# Patient Record
Sex: MALE | Race: WHITE | NOT HISPANIC OR LATINO
[De-identification: names, ages, dates, MRNs, and addresses within clinical notes are randomized per-mention and may not be internally consistent; named-entity substitution may affect disease eponyms.]

---

## 2022-03-24 PROBLEM — Z00.00 ENCOUNTER FOR PREVENTIVE HEALTH EXAMINATION: Status: ACTIVE | Noted: 2022-03-24

## 2022-05-06 ENCOUNTER — APPOINTMENT (OUTPATIENT)
Dept: ORTHOPEDIC SURGERY | Facility: CLINIC | Age: 62
End: 2022-05-06
Payer: OTHER MISCELLANEOUS

## 2022-05-06 VITALS — BODY MASS INDEX: 31.83 KG/M2 | HEIGHT: 72 IN | WEIGHT: 235 LBS

## 2022-05-06 DIAGNOSIS — I10 ESSENTIAL (PRIMARY) HYPERTENSION: ICD-10-CM

## 2022-05-06 PROCEDURE — 99072 ADDL SUPL MATRL&STAF TM PHE: CPT

## 2022-05-06 PROCEDURE — 99214 OFFICE O/P EST MOD 30 MIN: CPT

## 2022-05-06 PROCEDURE — 73564 X-RAY EXAM KNEE 4 OR MORE: CPT | Mod: RT

## 2022-05-06 NOTE — WORK
[Total] : total [Reveals pre-existing condition(s) that may affect treatment/prognosis] : reveals pre-existing condition(s) that may affect treatment/prognosis [Cannot return to work because ________] : cannot return to work because [unfilled] [Patient] : patient [No Rx restrictions] : No Rx restrictions. [I provided the services listed above] :  I provided the services listed above. [FreeTextEntry1] : james

## 2022-06-12 ENCOUNTER — FORM ENCOUNTER (OUTPATIENT)
Age: 62
End: 2022-06-12

## 2022-06-13 ENCOUNTER — APPOINTMENT (OUTPATIENT)
Dept: MRI IMAGING | Facility: CLINIC | Age: 62
End: 2022-06-13
Payer: OTHER MISCELLANEOUS

## 2022-06-13 PROCEDURE — 73721 MRI JNT OF LWR EXTRE W/O DYE: CPT | Mod: RT

## 2022-06-13 PROCEDURE — 99072 ADDL SUPL MATRL&STAF TM PHE: CPT

## 2022-07-08 ENCOUNTER — APPOINTMENT (OUTPATIENT)
Dept: ORTHOPEDIC SURGERY | Facility: CLINIC | Age: 62
End: 2022-07-08

## 2022-07-08 VITALS — HEIGHT: 72 IN | WEIGHT: 235 LBS | BODY MASS INDEX: 31.83 KG/M2

## 2022-07-08 PROCEDURE — 99072 ADDL SUPL MATRL&STAF TM PHE: CPT

## 2022-07-08 PROCEDURE — 99214 OFFICE O/P EST MOD 30 MIN: CPT

## 2022-07-08 NOTE — WORK
[Total] : total [Reveals pre-existing condition(s) that may affect treatment/prognosis] : reveals pre-existing condition(s) that may affect treatment/prognosis [Cannot return to work because ________] : cannot return to work because [unfilled] [Patient] : patient [No Rx restrictions] : No Rx restrictions. [I provided the services listed above] :  I provided the services listed above. [FreeTextEntry1] : fair

## 2022-07-08 NOTE — DISCUSSION/SUMMARY
[de-identified] : Patient allowed to gently start resuming activities. \par Discussed change to medication prescription and usage. \par Bracing options discussed with patient. \par Activity modification as needed\par Discussed poss future surgery, pt deciding\par request comp auth R knee arthroscopy med meniscectomy, surg discussion questions answered, no guarantees as has degen changes\par request comp auth for orthovisc R knee

## 2022-07-08 NOTE — REVIEW OF SYSTEMS
[Joint Pain] : joint pain [Joint Swelling] : joint swelling [Negative] : Heme/Lymph [FreeTextEntry9] : rt knee

## 2022-07-08 NOTE — DATA REVIEWED
[MRI] : MRI [Right] : of the right [Knee] : knee [Report was reviewed and noted in the chart] : The report was reviewed and noted in the chart [I reviewed the films/CD and agree] : I reviewed the films/CD and agree [FreeTextEntry1] : tear MM, effusion and degen changes

## 2022-07-08 NOTE — PHYSICAL EXAM
[Right] : right knee [Positive] : positive Ludy [] : mildly antalgic [TWNoteComboBox7] : flexion 135 degrees

## 2022-07-08 NOTE — HISTORY OF PRESENT ILLNESS
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

## 2022-07-31 ENCOUNTER — FORM ENCOUNTER (OUTPATIENT)
Age: 62
End: 2022-07-31

## 2023-01-20 ENCOUNTER — APPOINTMENT (OUTPATIENT)
Dept: ORTHOPEDIC SURGERY | Facility: CLINIC | Age: 63
End: 2023-01-20
Payer: OTHER MISCELLANEOUS

## 2023-01-20 VITALS — BODY MASS INDEX: 31.83 KG/M2 | WEIGHT: 235 LBS | HEIGHT: 72 IN

## 2023-01-20 DIAGNOSIS — Z78.9 OTHER SPECIFIED HEALTH STATUS: ICD-10-CM

## 2023-01-20 PROCEDURE — 99072 ADDL SUPL MATRL&STAF TM PHE: CPT

## 2023-01-20 PROCEDURE — 99214 OFFICE O/P EST MOD 30 MIN: CPT

## 2023-01-20 NOTE — HISTORY OF PRESENT ILLNESS
[Work related] : work related [Sudden] : sudden [Localized] : localized [Household chores] : household chores [Rest] : rest [Not working due to injury] : Work status: not working due to injury [de-identified] : this happened 5/2/22 and was climbing out of oil truck and developed pain, no injury, has not worked since, tried tylenol, no swelling, has torn med meniscus and had episode of swelling couple of weeks ago which has decreased some what [9] : 9 [Radiating] : radiating [Stabbing] : stabbing [Nothing helps with pain getting better] : Nothing helps with pain getting better [Sitting] : sitting [Walking] : walking [] : yes [FreeTextEntry1] : rt knee [FreeTextEntry3] : 8-13-21 [FreeTextEntry7] : below knee [FreeTextEntry9] : sleeve, tylenol [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

## 2023-01-20 NOTE — DISCUSSION/SUMMARY
[de-identified] : Patient allowed to gently start resuming activities. \par Discussed change to medication prescription and usage. \par Activity modification as needed\par Discussed poss future surgery, he desires\par request comp auth R knee arthroscopy med meniscectomy, surg discussion questions answered, no guarantees as has degen changes\par WC fighting that it is related to the prir case

## 2023-03-03 ENCOUNTER — APPOINTMENT (OUTPATIENT)
Dept: ORTHOPEDIC SURGERY | Facility: CLINIC | Age: 63
End: 2023-03-03

## 2023-03-03 ENCOUNTER — APPOINTMENT (OUTPATIENT)
Dept: ORTHOPEDIC SURGERY | Facility: CLINIC | Age: 63
End: 2023-03-03
Payer: OTHER MISCELLANEOUS

## 2023-03-03 VITALS — WEIGHT: 235 LBS | HEIGHT: 72 IN | BODY MASS INDEX: 31.83 KG/M2

## 2023-03-03 PROCEDURE — 99072 ADDL SUPL MATRL&STAF TM PHE: CPT

## 2023-03-03 PROCEDURE — 99214 OFFICE O/P EST MOD 30 MIN: CPT

## 2023-03-03 NOTE — HISTORY OF PRESENT ILLNESS
[Work related] : work related [Sudden] : sudden [6] : 6 [Burning] : burning [Radiating] : radiating [Shooting] : shooting [Household chores] : household chores [Leisure] : leisure [Rest] : rest [Meds] : meds [Ice] : ice [Standing] : standing [Walking] : walking [Not working due to injury] : Work status: not working due to injury [de-identified] : Right knee symptoms persist. Injury 5/2/22 and was climbing out of oil truck and developed pain, no injury, has not worked since, tried tylenol, no swelling, has torn med meniscus  [] : no [FreeTextEntry1] : rt knee [FreeTextEntry3] : 5-2-22 [FreeTextEntry9] : brace [de-identified] : over use

## 2023-03-03 NOTE — DISCUSSION/SUMMARY
[de-identified] : Patient allowed to gently start resuming activities. \par Discussed change to medication prescription and usage. \par Activity modification as needed\par request comp auth R knee arthroscopy med meniscectomy, surg discussion questions answered, no guarantees as has degen changes\par WC fighting that it is related to the prior case

## 2023-03-03 NOTE — WORK
[Total] : total [Reveals pre-existing condition(s) that may affect treatment/prognosis] : reveals pre-existing condition(s) that may affect treatment/prognosis [Cannot return to work because ________] : cannot return to work because [unfilled] [Patient] : patient [No Rx restrictions] : No Rx restrictions. [I provided the services listed above] :  I provided the services listed above. [FreeTextEntry1] : fair needs surgery

## 2023-04-21 ENCOUNTER — APPOINTMENT (OUTPATIENT)
Dept: ORTHOPEDIC SURGERY | Facility: CLINIC | Age: 63
End: 2023-04-21
Payer: OTHER MISCELLANEOUS

## 2023-04-21 DIAGNOSIS — S83.91XD SPRAIN OF UNSPECIFIED SITE OF RIGHT KNEE, SUBSEQUENT ENCOUNTER: ICD-10-CM

## 2023-04-21 PROCEDURE — 99214 OFFICE O/P EST MOD 30 MIN: CPT

## 2023-04-21 RX ORDER — CELECOXIB 200 MG/1
200 CAPSULE ORAL
Qty: 30 | Refills: 0 | Status: ACTIVE | COMMUNITY
Start: 2023-04-21 | End: 1900-01-01

## 2023-04-21 NOTE — DISCUSSION/SUMMARY
[de-identified] : Patient allowed to gently start resuming activities. \par Discussed change to medication prescription and usage. 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 Patient had an injury to bilateral knees 8/13/21 and was treated in the office, returned to work and was feeling better, tore the meniscus right knee, on the 5/2/22. Please authorize surgery.\par \par Poss of CSI discussed.

## 2023-04-21 NOTE — HISTORY OF PRESENT ILLNESS
[Work related] : work related [Sudden] : sudden [6] : 6 [Burning] : burning [Radiating] : radiating [Shooting] : shooting [Household chores] : household chores [Leisure] : leisure [Rest] : rest [Meds] : meds [Ice] : ice [Standing] : standing [Walking] : walking [Not working due to injury] : Work status: not working due to injury [de-identified] : Right knee symptoms persist. 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 Patient has not yet been authorized for surgical intervention, scheduled for hearing.  [] : no [FreeTextEntry1] : rt knee [FreeTextEntry3] : 5-2-22 [de-identified] : over use [FreeTextEntry9] : brace

## 2023-04-21 NOTE — PHYSICAL EXAM
[Right] : right knee [Positive] : positive Ludy [] : ligamentously stable [TWNoteComboBox7] : flexion 135 degrees

## 2023-04-21 NOTE — WORK
[Reveals pre-existing condition(s) that may affect treatment/prognosis] : reveals pre-existing condition(s) that may affect treatment/prognosis [Cannot return to work because ________] : cannot return to work because [unfilled] [Patient] : patient [No Rx restrictions] : No Rx restrictions. [I provided the services listed above] :  I provided the services listed above. [FreeTextEntry1] : fair needs surgery

## 2023-05-19 ENCOUNTER — APPOINTMENT (OUTPATIENT)
Dept: ORTHOPEDIC SURGERY | Facility: CLINIC | Age: 63
End: 2023-05-19

## 2024-01-10 ENCOUNTER — APPOINTMENT (OUTPATIENT)
Dept: ORTHOPEDIC SURGERY | Facility: CLINIC | Age: 64
End: 2024-01-10
Payer: OTHER MISCELLANEOUS

## 2024-01-10 VITALS — BODY MASS INDEX: 31.83 KG/M2 | WEIGHT: 235 LBS | HEIGHT: 72 IN

## 2024-01-10 DIAGNOSIS — M17.11 UNILATERAL PRIMARY OSTEOARTHRITIS, RIGHT KNEE: ICD-10-CM

## 2024-01-10 DIAGNOSIS — Z86.718 PERSONAL HISTORY OF OTHER VENOUS THROMBOSIS AND EMBOLISM: ICD-10-CM

## 2024-01-10 PROCEDURE — 99203 OFFICE O/P NEW LOW 30 MIN: CPT

## 2024-01-10 NOTE — DISCUSSION/SUMMARY
[de-identified] : Extensive discussion of the options was had with the patient. This discussion included both surgical and nonsurgical options. Options including but not limited to cortisone injection, viscosupplementation, physical therapy, oral anti-inflammatories, MRI, arthroplasty and arthroscopy were discussed with the patient. We also discussed the option of observation, allowing the patient to continue rest, ice, NSAIDs and following up if the condition does not improve. Time was taken to go over any questions the patient had in regards to any of the treatment plans described. The Risks, benefits, alternatives and expectations of the proposed procedure, as well as non-operative management, were discussed at length with the patient, as well as the procedure itself and the expected recovery period. The possible need for post operative Physical Therapy was also discussed. The patient was allowed as much time as necessary to ask all appropriate questions and they were answered to the patient's satisfaction.  Risks involving the knee arthroscopy were discussed with the patient and include infection, bleeding, anesthesias complications, NV injury.  He states he has tried PT without relief and has tried NSAIDS without relief.   The following information has been documented by Collette Khan acting as a scribe. Dr. Ramirez Attestation The documentation recorded by the scribe, in my presence, accurately reflects the service I, Dr. Ramirez, personally performed, and the decisions made by me with my edits as appropriate.

## 2024-01-10 NOTE — HISTORY OF PRESENT ILLNESS
[de-identified] : Patient is here today for the right knee, W/C injury that occurred on 8/13/2021. Patient had MRI Right knee. He states he had CSI in the past as well as PT.  He never had knee surgery.  He notes pain anterior and anteromedial aspect of the knee. He notes clicking and popping of the knee. He states the knee georgina as well on occasion. He states the knee "blows" up at times.

## 2024-01-10 NOTE — PHYSICAL EXAM
[Right] : right knee [AP] : anteroposterior [Lateral] : lateral [Outside films reviewed] : Outside films reviewed [de-identified] : Constitutional: The patient appears well developed, well nourished. Examination of patients ability to communicate functionally was normal.       Neurologic: Coordination is normal. Alert and oriented to time, place and person. No evidence of mood disorder, calm affect.         RIGHT    KNEE: Inspection of the knee is as follows: mild effusion. no ecchymosis, no streaking, no erythema, no atrophy, no deformities of the quad tendon and no deformities of patellar tendon.       Palpation of the knee is as follows: LATERAL JOINT LINE AND  medial joint line tenderness. no obvious defects, no palpable masses, no increased warmth and no crepitus.       Knee Range of Motion is as follows in degrees:       Extension: 0    Flexion: 105      Strength examination of the knee is as follows:       Quadriceps strength is 5/5   Hamstring strength is 5/5       Ligament Stability and Special Test:  positive McMurrays test. ligamentously stable, negative anterior draw, negative Lachman test, negative posterior draw and no varus or valgus instability. patella tracks well and able to do active straight leg raise without an extensor lag.       Neurological examination of the knee is as follows: light touch is intact throughout.       Gait and function is as follows: MILD antalgic gait.  [FreeTextEntry9] : ZP show med/lateral femoral condyle degenerative changes

## 2024-02-15 RX ORDER — HYDROCODONE BITARTRATE AND ACETAMINOPHEN 5; 325 MG/1; MG/1
5-325 TABLET ORAL
Qty: 25 | Refills: 0 | Status: ACTIVE | COMMUNITY
Start: 2024-02-15 | End: 1900-01-01

## 2024-02-16 ENCOUNTER — APPOINTMENT (OUTPATIENT)
Dept: ORTHOPEDIC SURGERY | Facility: AMBULATORY SURGERY CENTER | Age: 64
End: 2024-02-16
Payer: OTHER MISCELLANEOUS

## 2024-02-16 PROCEDURE — 29881 ARTHRS KNE SRG MNISECTMY M/L: CPT | Mod: AS,RT

## 2024-02-16 PROCEDURE — 29881 ARTHRS KNE SRG MNISECTMY M/L: CPT

## 2024-02-16 PROCEDURE — 20610 DRAIN/INJ JOINT/BURSA W/O US: CPT | Mod: RT,59

## 2024-02-23 ENCOUNTER — APPOINTMENT (OUTPATIENT)
Dept: ORTHOPEDIC SURGERY | Facility: CLINIC | Age: 64
End: 2024-02-23
Payer: OTHER MISCELLANEOUS

## 2024-02-23 DIAGNOSIS — Z47.89 ENCOUNTER FOR OTHER ORTHOPEDIC AFTERCARE: ICD-10-CM

## 2024-02-23 PROCEDURE — 99024 POSTOP FOLLOW-UP VISIT: CPT

## 2024-02-23 NOTE — HISTORY OF PRESENT ILLNESS
[de-identified] : Date of surgery was 02/16/2024 Surgery performed was right knee arthroscopy, partial medial meniscectomy, medial femoral condyle chondroplasty by Dr Ramirez Current pain score is 5/10 Current medications taken are aspirin, Tylenol Physical therapy is not started yet Patient called earlier this week reporting knee swelling. He has history of DVT. Denies focal calf pain or SOB.  He states swelling is improved but he was given an appointment with me for clinical check.

## 2024-02-23 NOTE — IMAGING
[de-identified] : Laterality is right    Patient is in no acute distress, alert and oriented Sensation is grossly intact to light touch in the foot Motor function is 5/5 in the foot Capillary refill is less than 2 seconds in the toes Lymphadenopathy is not present Peripheral edema is not present   Skin is intact Incisions are healing well Effusion is trace Quadriceps atrophy is present Calf tenderness is not present   Knee extension is 0 Knee flexion is 90   Quadriceps strength is 5-/5 Hamstring strength is 5/5

## 2024-02-23 NOTE — DISCUSSION/SUMMARY
[de-identified] : Patient has no clinical signs of infection or DVT. Discussed option for duplex, he defers at this time.  Patient to continue WBAT. Script provided for PT.  Tylenol as needed for pain.   Patient to follow up as scheduled with Dr. Ramirez's team next week for suture removal.

## 2024-02-28 ENCOUNTER — APPOINTMENT (OUTPATIENT)
Dept: ORTHOPEDIC SURGERY | Facility: CLINIC | Age: 64
End: 2024-02-28
Payer: OTHER MISCELLANEOUS

## 2024-02-28 PROCEDURE — 99024 POSTOP FOLLOW-UP VISIT: CPT

## 2024-02-28 NOTE — PHYSICAL EXAM
[de-identified] : Constitutional: The patient appears well developed, well nourished.   Neurologic: Coordination is normal. Alert and oriented to time, place and person. No evidence of mood disorder, calm affect.    RIGHT    KNEE: Inspection of the knee is as follows: moderate effusion and small ecchymosis. no streaking, no erythema, no atrophy, no deformities of the quad tendon and no deformities of patellar tendon.   Inspection of the wound reveals clean and dry incision, no fluctuance, no sign of infection, no erythema and no drainage. Mesh/Sutures were removed.  Palpation of the knee is as follows: no increased warmth.  Knee Range of Motion is as follows in degrees:    Extension: 0 Flexion: 90  CALF SOFT NT NVID Strength examination of the knee is as follows:  Quadriceps strength is 5/5  Hamstring strength is 5/5   Ligament Stability and Special Test ligamentously stable and no varus or valgus instability. patella tracks well and able to do active straight leg raise without an extensor lag.   Neurological examination of the knee is as follows: light touch is intact throughout.   Gait and function is as follows: mildly antalgic gait.

## 2024-02-28 NOTE — DISCUSSION/SUMMARY
[de-identified] : Patient declined PT. He will work on ROM WBAT and f/u with Dr Ramirez in 1 mos. ANA REYES

## 2024-02-28 NOTE — HISTORY OF PRESENT ILLNESS
[de-identified] : following up for the right knee. Patient is s/p R knee scope partial medial meniscectomy, medial femoral condyle chondroplasty 2/16/2024.  Patient denies any fever chills or drainage. He states the knee has been swollen but continues to improve.  He saw Dr Lorenzana 5 days ago and was given Rx for PT. He did not go to PT.  He denies any calf pain or swelling.  He has hx of DVT x 2 unprovoked in the past

## 2024-03-27 ENCOUNTER — APPOINTMENT (OUTPATIENT)
Dept: ORTHOPEDIC SURGERY | Facility: CLINIC | Age: 64
End: 2024-03-27
Payer: OTHER MISCELLANEOUS

## 2024-03-27 DIAGNOSIS — S83.241A OTHER TEAR OF MEDIAL MENISCUS, CURRENT INJURY, RIGHT KNEE, INITIAL ENCOUNTER: ICD-10-CM

## 2024-03-27 PROCEDURE — 99024 POSTOP FOLLOW-UP VISIT: CPT

## 2024-03-27 NOTE — HISTORY OF PRESENT ILLNESS
[de-identified] : following up for the right knee. Patient is s/p R knee scope partial medial meniscectomy, medial femoral condyle chondroplasty 2/16/2024.  Patient is going to PT TIW.  He deniesa ny fever chills or drainage.  He states the swelling is minimal at this point and the pain "is not that bad".

## 2024-03-27 NOTE — PHYSICAL EXAM
[de-identified] : Constitutional: The patient appears well developed, well nourished.   Neurologic: Coordination is normal. Alert and oriented to time, place and person. No evidence of mood disorder, calm affect.    RIGHT    KNEE: Inspection of the knee is as follows: MILD  effusion and NO ecchymosis. no streaking, no erythema, no atrophy, no deformities of the quad tendon and no deformities of patellar tendon.   Inspection of the wound reveals WOUNDS WELL HEALED  Palpation of the knee is as follows: no increased warmth.  Knee Range of Motion is as follows in degrees:    Extension: 0 Flexion: 110  CALF SOFT NT NVID Strength examination of the knee is as follows:  Quadriceps strength is 5/5  Hamstring strength is 5/5   Ligament Stability and Special Test ligamentously stable and no varus or valgus instability. patella tracks well and able to do active straight leg raise without an extensor lag.   Neurological examination of the knee is as follows: light touch is intact throughout.   Gait and function is as follows: mildly antalgic gait.

## 2024-03-27 NOTE — DISCUSSION/SUMMARY
[de-identified] : Patient will continue with PT no restrictions. He is progressing well. He states he is not [place] because he is retired. Plan to continue PT and HEP f/u PRN

## 2024-05-20 NOTE — DISCUSSION/SUMMARY
[de-identified] : Patient allowed to gently start resuming activities. \par Discussed change to medication prescription and usage. \par Bracing options discussed with the pt\par Name of Insurance\par Insurance Address:\par \par 05/06/2022 \par \par  \par \par RE:  [employer] \par \par Acct #- [id number] \par \par \par Attention:  Nurse Reviewer /Medical Director\par \par I am writing this letter as a request for comp authorization for PT program.\par Patient has tried analgesics, non-steroid anti-inflammatory agents, \par hot or cold compresses,injections of corticosteroids, etc)  which in combination or by themselves has not worked.\par \par  \par Based on my patient's condition, I strongly believe that the Hyaluronic aid injections is medically needed.\par  \par Thank you for your time and consideration.   \par  \par \par \par \par 
64

## 2024-09-04 ENCOUNTER — RESULT REVIEW (OUTPATIENT)
Age: 64
End: 2024-09-04

## 2024-09-04 ENCOUNTER — APPOINTMENT (OUTPATIENT)
Dept: PHYSICAL MEDICINE AND REHAB | Facility: CLINIC | Age: 64
End: 2024-09-04
Payer: COMMERCIAL

## 2024-09-04 DIAGNOSIS — M62.838 OTHER MUSCLE SPASM: ICD-10-CM

## 2024-09-04 DIAGNOSIS — M54.12 RADICULOPATHY, CERVICAL REGION: ICD-10-CM

## 2024-09-04 DIAGNOSIS — M50.90 CERVICAL DISC DISORDER, UNSPECIFIED, UNSPECIFIED CERVICAL REGION: ICD-10-CM

## 2024-09-04 DIAGNOSIS — M62.830 MUSCLE SPASM OF BACK: ICD-10-CM

## 2024-09-04 DIAGNOSIS — M24.9 JOINT DERANGEMENT, UNSPECIFIED: ICD-10-CM

## 2024-09-04 DIAGNOSIS — M51.26 OTHER INTERVERTEBRAL DISC DISPLACEMENT, LUMBAR REGION: ICD-10-CM

## 2024-09-04 PROCEDURE — 99205 OFFICE O/P NEW HI 60 MIN: CPT

## 2024-09-04 RX ORDER — CYCLOBENZAPRINE HYDROCHLORIDE 10 MG/1
10 TABLET, FILM COATED ORAL
Qty: 60 | Refills: 0 | Status: ACTIVE | COMMUNITY
Start: 2024-09-04 | End: 1900-01-01

## 2024-09-04 RX ORDER — MELOXICAM 15 MG/1
15 TABLET ORAL DAILY
Qty: 15 | Refills: 0 | Status: ACTIVE | COMMUNITY
Start: 2024-09-04 | End: 1900-01-01

## 2024-09-04 NOTE — HISTORY OF PRESENT ILLNESS
[FreeTextEntry1] : Patient is a 64-year-old male who presents for evaluation of neck and low back pain.  Patient reports that his low back pain is more chronic in nature with an onset of approximately 1 year ago.  He denies radicular symptoms involving his lower extremities.  Patient states approximately 3 weeks ago experienced acute onset of cervical spine pain.  He is unaware of any precipitating factor.  He reports that over the past 3 weeks he has been experiencing increasing muscle spasm primarily right-sided.  He reports occasional paresthesia involving his right upper extremity.  He reports his neck pain is aggravated with right rotation, and right lateral bending, as well as repetitive use of his right upper extremity. Patient rates his pain level at a 5 out of 10 at rest and an 8 out of 10 with activity.

## 2024-09-04 NOTE — ASSESSMENT
[FreeTextEntry1] :  X-ray cervical spine X-ray lumbar spine Mobic 15 mg p.o. daily with meal #15 Cyclobenzaprine 10 mg p.o. 3 times daily #60 PT 2-3xweekly/x4 weeks - C/S MH, ES, US, DTM - C/S special attn: right side  Trapezii and rhomboid stretch and strengthening  Scapular mobilization Isometrics: C/S Upper extremities PRE'S advanced as tolerated.  Instruction in proper posturing and body mechanics. Instruction in HEP.  Home exercise plan reviewed with patient. Stressed the importance for the need for frequent change in position from sit to stand and stand to sit, as well as use of weight shifting techniques to alleviate low back discomfort. Instruction in proper posturing, body mechanics and lifting techniques.  At least 60 minutes was spent with patient face to face examining patient, reviewing findings/results, counseling patient and coordinating treatment program. Ample time was provided to answer any questions or address concerns to the patients satisfaction. Recheck 4 weeks

## 2024-09-04 NOTE — END OF VISIT
[FreeTextEntry3] :  The following information has been documented by Paula Caraballo acting as a scribe.

## 2024-10-21 ENCOUNTER — APPOINTMENT (OUTPATIENT)
Dept: PHYSICAL MEDICINE AND REHAB | Facility: CLINIC | Age: 64
End: 2024-10-21

## 2025-06-11 ENCOUNTER — APPOINTMENT (OUTPATIENT)
Dept: ORTHOPEDIC SURGERY | Facility: CLINIC | Age: 65
End: 2025-06-11
Payer: OTHER MISCELLANEOUS

## 2025-06-11 VITALS — HEIGHT: 72 IN | WEIGHT: 240 LBS | BODY MASS INDEX: 32.51 KG/M2

## 2025-06-11 PROCEDURE — 99214 OFFICE O/P EST MOD 30 MIN: CPT | Mod: 25

## 2025-06-11 PROCEDURE — 73562 X-RAY EXAM OF KNEE 3: CPT | Mod: RT

## 2025-06-11 PROCEDURE — 20610 DRAIN/INJ JOINT/BURSA W/O US: CPT | Mod: RT

## 2025-06-11 RX ORDER — LOSARTAN POTASSIUM 100 MG/1
TABLET, FILM COATED ORAL
Refills: 0 | Status: ACTIVE | COMMUNITY

## 2025-07-09 ENCOUNTER — APPOINTMENT (OUTPATIENT)
Dept: ORTHOPEDIC SURGERY | Facility: CLINIC | Age: 65
End: 2025-07-09
Payer: OTHER MISCELLANEOUS

## 2025-07-09 PROCEDURE — 99214 OFFICE O/P EST MOD 30 MIN: CPT

## 2025-08-18 ENCOUNTER — RESULT REVIEW (OUTPATIENT)
Age: 65
End: 2025-08-18

## 2025-08-20 ENCOUNTER — APPOINTMENT (OUTPATIENT)
Dept: ORTHOPEDIC SURGERY | Facility: CLINIC | Age: 65
End: 2025-08-20
Payer: OTHER MISCELLANEOUS

## 2025-08-20 PROCEDURE — 20610 DRAIN/INJ JOINT/BURSA W/O US: CPT | Mod: RT

## 2025-08-27 ENCOUNTER — APPOINTMENT (OUTPATIENT)
Dept: ORTHOPEDIC SURGERY | Facility: CLINIC | Age: 65
End: 2025-08-27
Payer: OTHER MISCELLANEOUS

## 2025-08-27 PROCEDURE — 20610 DRAIN/INJ JOINT/BURSA W/O US: CPT | Mod: RT

## 2025-09-03 ENCOUNTER — APPOINTMENT (OUTPATIENT)
Dept: ORTHOPEDIC SURGERY | Facility: CLINIC | Age: 65
End: 2025-09-03
Payer: OTHER MISCELLANEOUS

## 2025-09-03 DIAGNOSIS — M17.11 UNILATERAL PRIMARY OSTEOARTHRITIS, RIGHT KNEE: ICD-10-CM

## 2025-09-03 PROCEDURE — 20610 DRAIN/INJ JOINT/BURSA W/O US: CPT | Mod: RT
